# Patient Record
Sex: MALE | Race: WHITE | NOT HISPANIC OR LATINO | Employment: FULL TIME | URBAN - METROPOLITAN AREA
[De-identification: names, ages, dates, MRNs, and addresses within clinical notes are randomized per-mention and may not be internally consistent; named-entity substitution may affect disease eponyms.]

---

## 2019-06-11 ENCOUNTER — HOSPITAL ENCOUNTER (OUTPATIENT)
Dept: ULTRASOUND IMAGING | Facility: HOSPITAL | Age: 19
Discharge: HOME | End: 2019-06-11

## 2019-06-11 ENCOUNTER — HOSPITAL ENCOUNTER (EMERGENCY)
Facility: HOSPITAL | Age: 19
End: 2019-06-11

## 2019-06-11 ENCOUNTER — TRANSCRIBE ORDERS (OUTPATIENT)
Dept: ADMINISTRATIVE | Facility: HOSPITAL | Age: 19
End: 2019-06-11

## 2019-06-11 ENCOUNTER — HOSPITAL ENCOUNTER (OUTPATIENT)
Dept: ULTRASOUND IMAGING | Facility: HOSPITAL | Age: 19
Discharge: HOME | End: 2019-06-11
Admitting: EMERGENCY MEDICINE

## 2019-06-11 DIAGNOSIS — R10.31 RIGHT GROIN PAIN: Primary | ICD-10-CM

## 2019-06-11 DIAGNOSIS — R10.31 RT GROIN PAIN: Primary | ICD-10-CM

## 2019-06-11 DIAGNOSIS — R10.31 RIGHT GROIN PAIN: ICD-10-CM

## 2019-06-11 DIAGNOSIS — R10.31 RT GROIN PAIN: ICD-10-CM

## 2019-06-11 PROCEDURE — 93975 VASCULAR STUDY: CPT

## 2019-06-11 PROCEDURE — 76882 US LMTD JT/FCL EVL NVASC XTR: CPT

## 2019-06-11 PROCEDURE — 76870 US EXAM SCROTUM: CPT

## 2021-02-23 ENCOUNTER — OFFICE VISIT (OUTPATIENT)
Dept: FAMILY MEDICINE CLINIC | Facility: CLINIC | Age: 21
End: 2021-02-23

## 2021-02-23 VITALS
OXYGEN SATURATION: 98 % | HEIGHT: 65 IN | SYSTOLIC BLOOD PRESSURE: 150 MMHG | DIASTOLIC BLOOD PRESSURE: 90 MMHG | WEIGHT: 315 LBS | TEMPERATURE: 98 F | BODY MASS INDEX: 52.48 KG/M2 | HEART RATE: 87 BPM

## 2021-02-23 DIAGNOSIS — I10 SEVERE HYPERTENSION: ICD-10-CM

## 2021-02-23 DIAGNOSIS — I10 ESSENTIAL HYPERTENSION: Primary | ICD-10-CM

## 2021-02-23 DIAGNOSIS — E66.01 MORBID (SEVERE) OBESITY DUE TO EXCESS CALORIES (HCC): ICD-10-CM

## 2021-02-23 PROCEDURE — 99203 OFFICE O/P NEW LOW 30 MIN: CPT | Performed by: FAMILY MEDICINE

## 2021-02-23 RX ORDER — LISINOPRIL 20 MG/1
20 TABLET ORAL DAILY
Qty: 30 TABLET | Refills: 1 | Status: SHIPPED | OUTPATIENT
Start: 2021-02-23 | End: 2021-03-19 | Stop reason: SDUPTHER

## 2021-02-23 NOTE — PROGRESS NOTES
Chief Complaint   Patient presents with   • Establish Care   • Hypertension     BP issues was 190/107 yesterday 02.22.21       Subjective   Inocencio Ulrich is a 20 y.o. male.     History of Present Illness   20-year-old male here to establish as a new patient    Denies any past medical history.    Elevated blood pressure: He states he checked his blood pressure yesterday with a home wrist blood pressure cuff and it was 190.  Denies any prior history with blood pressure but upon further discussion says that it has slowly been increasing.  Today it was 150/90 in clinic.  He feels well and is asymptomatic.Body mass index is 57.86 kg/m².    He has a strong family history of hypertension as well as diabetes in his family.  BMI 57.  He states he does drink sodas daily.  Poor diet and sedentary job.  He also drinks beer almost every night.    No recent blood work he has not seen a doctor since he saw his pediatrician years prior.  Is not on any prescription medications.    Overall feels well.  Is going to be switching jobs to one that requires him to do more manual activities.    He does not smoke.  He has smoked in the past.      The following portions of the patient's history were reviewed and updated as appropriate: allergies, current medications, past family history, past medical history, past social history, past surgical history and problem list.      History reviewed. No pertinent past medical history.    History reviewed. No pertinent surgical history.    Family History   Problem Relation Age of Onset   • Hypertension Mother    • Depression Mother    • Anxiety disorder Mother    • Alcohol abuse Father        Social History     Socioeconomic History   • Marital status: Single     Spouse name: Not on file   • Number of children: Not on file   • Years of education: Not on file   • Highest education level: Not on file   Tobacco Use   • Smoking status: Former Smoker     Packs/day: 0.50     Types: Cigarettes   • Smokeless  tobacco: Never Used   Substance and Sexual Activity   • Drug use: Not Currently   • Sexual activity: Yes       No current outpatient medications on file prior to visit.     No current facility-administered medications on file prior to visit.        Review of Systems   Constitutional: Negative for activity change, chills and fever.   HENT: Negative for congestion, postnasal drip and rhinorrhea.    Eyes: Negative for blurred vision and pain.   Respiratory: Negative for cough, chest tightness and shortness of breath.    Cardiovascular: Negative for chest pain.   Gastrointestinal: Negative for abdominal pain, constipation, diarrhea, nausea and vomiting.   Endocrine: Negative for cold intolerance and heat intolerance.   Genitourinary: Negative for decreased urine volume, dysuria and frequency.   Musculoskeletal: Negative for arthralgias, back pain and myalgias.   Skin: Negative for rash and skin lesions.   Neurological: Negative for dizziness and confusion.   Psychiatric/Behavioral: Negative for agitation, behavioral problems and depressed mood.           Vitals:    02/23/21 1404   BP: 150/90   Pulse: 87   Temp: 98 °F (36.7 °C)   SpO2: 98%      Objective   Physical Exam  Vitals signs and nursing note reviewed.   Constitutional:       Appearance: He is well-developed. He is obese.   HENT:      Head: Normocephalic.   Eyes:      Conjunctiva/sclera: Conjunctivae normal.      Pupils: Pupils are equal, round, and reactive to light.   Neck:      Musculoskeletal: Neck supple.   Cardiovascular:      Rate and Rhythm: Normal rate and regular rhythm.      Heart sounds: Normal heart sounds. No murmur.   Pulmonary:      Effort: Pulmonary effort is normal. No respiratory distress.      Breath sounds: Normal breath sounds.   Abdominal:      General: Bowel sounds are normal.      Palpations: Abdomen is soft.   Musculoskeletal: Normal range of motion.   Skin:     General: Skin is warm and dry.   Neurological:      Mental Status: He is  alert and oriented to person, place, and time.           Assessment/Plan   Problems Addressed this Visit     None      Visit Diagnoses     Essential hypertension    -  Primary    Relevant Medications    lisinopril (PRINIVIL,ZESTRIL) 20 MG tablet    Other Relevant Orders    Hemoglobin A1c    Lipid Panel    CBC & Differential    Comprehensive Metabolic Panel    T4    TSH    Morbid (severe) obesity due to excess calories (CMS/HCC)        Relevant Orders    Hemoglobin A1c    Lipid Panel    CBC & Differential    Comprehensive Metabolic Panel    T4    TSH    Severe hypertension        Relevant Medications    lisinopril (PRINIVIL,ZESTRIL) 20 MG tablet    Other Relevant Orders    Hemoglobin A1c    Lipid Panel    CBC & Differential    Comprehensive Metabolic Panel    T4    TSH      Diagnoses       Codes Comments    Essential hypertension    -  Primary ICD-10-CM: I10  ICD-9-CM: 401.9     Morbid (severe) obesity due to excess calories (CMS/HCC)     ICD-10-CM: E66.01  ICD-9-CM: 278.01     Severe hypertension     ICD-10-CM: I10  ICD-9-CM: 401.9           -BMI 57.  Blood pressure elevated at 150/90.  -Will obtain blood work today.  Asymptomatic.  May need cardiology referral.  We will also start on lisinopril  -Consult on decreasing daily beer as well as soda and calories via liquid as well as cutting back on snacking and increasing physical activity     To seek further care more urgently if he ever has blood pressures that high in the future and to notify us if any side effects with the lisinopril    Azul Cortez MD

## 2021-02-24 LAB
ALBUMIN SERPL-MCNC: 4.6 G/DL (ref 4.1–5.2)
ALBUMIN/GLOB SERPL: 1.8 {RATIO} (ref 1.2–2.2)
ALP SERPL-CCNC: 67 IU/L (ref 39–117)
ALT SERPL-CCNC: 31 IU/L (ref 0–44)
AST SERPL-CCNC: 16 IU/L (ref 0–40)
BASOPHILS # BLD AUTO: 0.1 X10E3/UL (ref 0–0.2)
BASOPHILS NFR BLD AUTO: 1 %
BILIRUB SERPL-MCNC: 0.3 MG/DL (ref 0–1.2)
BUN SERPL-MCNC: 10 MG/DL (ref 6–20)
BUN/CREAT SERPL: 12 (ref 9–20)
CALCIUM SERPL-MCNC: 9.5 MG/DL (ref 8.7–10.2)
CHLORIDE SERPL-SCNC: 103 MMOL/L (ref 96–106)
CHOLEST SERPL-MCNC: 219 MG/DL (ref 100–199)
CO2 SERPL-SCNC: 25 MMOL/L (ref 20–29)
CREAT SERPL-MCNC: 0.81 MG/DL (ref 0.76–1.27)
EOSINOPHIL # BLD AUTO: 0.1 X10E3/UL (ref 0–0.4)
EOSINOPHIL NFR BLD AUTO: 2 %
ERYTHROCYTE [DISTWIDTH] IN BLOOD BY AUTOMATED COUNT: 13.1 % (ref 11.6–15.4)
GLOBULIN SER CALC-MCNC: 2.5 G/DL (ref 1.5–4.5)
GLUCOSE SERPL-MCNC: 92 MG/DL (ref 65–99)
HBA1C MFR BLD: 5.3 % (ref 4.8–5.6)
HCT VFR BLD AUTO: 45.8 % (ref 37.5–51)
HDLC SERPL-MCNC: 39 MG/DL
HGB BLD-MCNC: 15 G/DL (ref 13–17.7)
IMM GRANULOCYTES # BLD AUTO: 0 X10E3/UL (ref 0–0.1)
IMM GRANULOCYTES NFR BLD AUTO: 1 %
LDLC SERPL CALC-MCNC: 132 MG/DL (ref 0–99)
LYMPHOCYTES # BLD AUTO: 2 X10E3/UL (ref 0.7–3.1)
LYMPHOCYTES NFR BLD AUTO: 30 %
MCH RBC QN AUTO: 28.4 PG (ref 26.6–33)
MCHC RBC AUTO-ENTMCNC: 32.8 G/DL (ref 31.5–35.7)
MCV RBC AUTO: 87 FL (ref 79–97)
MONOCYTES # BLD AUTO: 0.5 X10E3/UL (ref 0.1–0.9)
MONOCYTES NFR BLD AUTO: 8 %
NEUTROPHILS # BLD AUTO: 4 X10E3/UL (ref 1.4–7)
NEUTROPHILS NFR BLD AUTO: 58 %
PLATELET # BLD AUTO: 238 X10E3/UL (ref 150–450)
POTASSIUM SERPL-SCNC: 4.2 MMOL/L (ref 3.5–5.2)
PROT SERPL-MCNC: 7.1 G/DL (ref 6–8.5)
RBC # BLD AUTO: 5.29 X10E6/UL (ref 4.14–5.8)
SODIUM SERPL-SCNC: 142 MMOL/L (ref 134–144)
T4 SERPL-MCNC: 7.5 UG/DL (ref 4.5–12)
TRIGL SERPL-MCNC: 267 MG/DL (ref 0–149)
TSH SERPL DL<=0.005 MIU/L-ACNC: 1.78 UIU/ML (ref 0.45–4.5)
VLDLC SERPL CALC-MCNC: 48 MG/DL (ref 5–40)
WBC # BLD AUTO: 6.7 X10E3/UL (ref 3.4–10.8)

## 2021-02-25 ENCOUNTER — TELEPHONE (OUTPATIENT)
Dept: FAMILY MEDICINE CLINIC | Facility: CLINIC | Age: 21
End: 2021-02-25

## 2021-02-25 NOTE — TELEPHONE ENCOUNTER
Caller: Inocencio Ulrich    Relationship: Self    Best call back number: [phone]    What test was performed: LAB WORK    When was the test performed: 02/23    Where was the test performed: [facility]    Additional notes: PLEASE CALL PT TO DISCUSS RESULTS

## 2021-02-25 NOTE — PROGRESS NOTES
Can you please notify:    His cholesterol was mildly elevated. Otherwise labs look good. His blood sugars are normal. His kidney function is normal. His thyroid looks good

## 2021-03-19 ENCOUNTER — OFFICE VISIT (OUTPATIENT)
Dept: FAMILY MEDICINE CLINIC | Facility: CLINIC | Age: 21
End: 2021-03-19

## 2021-03-19 VITALS
SYSTOLIC BLOOD PRESSURE: 136 MMHG | DIASTOLIC BLOOD PRESSURE: 84 MMHG | HEIGHT: 65 IN | WEIGHT: 315 LBS | BODY MASS INDEX: 52.48 KG/M2 | OXYGEN SATURATION: 98 % | HEART RATE: 80 BPM | TEMPERATURE: 98 F

## 2021-03-19 DIAGNOSIS — R63.4 WEIGHT LOSS: ICD-10-CM

## 2021-03-19 DIAGNOSIS — E78.2 MIXED HYPERLIPIDEMIA: ICD-10-CM

## 2021-03-19 DIAGNOSIS — I10 ESSENTIAL HYPERTENSION: Primary | ICD-10-CM

## 2021-03-19 DIAGNOSIS — E66.01 MORBID (SEVERE) OBESITY DUE TO EXCESS CALORIES (HCC): ICD-10-CM

## 2021-03-19 PROCEDURE — 99214 OFFICE O/P EST MOD 30 MIN: CPT | Performed by: FAMILY MEDICINE

## 2021-03-19 RX ORDER — LISINOPRIL 20 MG/1
20 TABLET ORAL DAILY
Qty: 90 TABLET | Refills: 1 | Status: SHIPPED | OUTPATIENT
Start: 2021-03-19 | End: 2021-12-10 | Stop reason: SDUPTHER

## 2021-03-19 NOTE — PROGRESS NOTES
Chief Complaint   Patient presents with   • Hypertension       Subjective   Inocencio Ulrich is a 20 y.o. male.     History of Present Illness   20-year-old male here for follow-up on hypertension    Hypertension: Doing well with lisinopril 20mg he is now normotensive in clinic and he checks at home.  He has not had any lightheadedness or dizziness.  He has no adverse effects.  No chest pain, shortness of breath etc.  Normotensive.  Tolerates lisinopril well.    Weight loss: He has lost 20 pounds since his last appointment.  Is working hard on dietary changes.  He will eat a banana for breakfast.  Drinks a lot of water.  Eats some protein for lunch.  And again for dinner.  He is not having any more soft drinks.  Has cut out sodas completely.  His family is working with him on this as well so he is good family support.  Body mass index is 54.58 kg/m².      The following portions of the patient's history were reviewed and updated as appropriate: allergies, current medications, past family history, past medical history, past social history, past surgical history and problem list.      No past medical history on file.    No past surgical history on file.    Family History   Problem Relation Age of Onset   • Hypertension Mother    • Depression Mother    • Anxiety disorder Mother    • Alcohol abuse Father        Social History     Socioeconomic History   • Marital status: Single     Spouse name: Not on file   • Number of children: Not on file   • Years of education: Not on file   • Highest education level: Not on file   Tobacco Use   • Smoking status: Former Smoker     Packs/day: 0.50     Types: Cigarettes   • Smokeless tobacco: Never Used   Substance and Sexual Activity   • Drug use: Not Currently   • Sexual activity: Yes       Current Outpatient Medications on File Prior to Visit   Medication Sig Dispense Refill   • [DISCONTINUED] lisinopril (PRINIVIL,ZESTRIL) 20 MG tablet Take 1 tablet by mouth Daily. 30 tablet 1     No  current facility-administered medications on file prior to visit.       Review of Systems   Constitutional: Negative for activity change, chills and fever.   HENT: Negative for congestion, postnasal drip and rhinorrhea.    Eyes: Negative for blurred vision and pain.   Respiratory: Negative for cough, chest tightness and shortness of breath.    Cardiovascular: Negative for chest pain.   Gastrointestinal: Negative for abdominal pain, constipation, diarrhea, nausea and vomiting.   Endocrine: Negative for cold intolerance and heat intolerance.   Genitourinary: Negative for decreased urine volume, dysuria and frequency.   Musculoskeletal: Negative for arthralgias, back pain and myalgias.   Skin: Negative for rash and skin lesions.   Neurological: Negative for dizziness and confusion.   Psychiatric/Behavioral: Negative for agitation, behavioral problems and depressed mood.           Vitals:    03/19/21 1052   BP: 136/84   Pulse: 80   Temp: 98 °F (36.7 °C)   SpO2: 98%      Objective   Physical Exam  Vitals and nursing note reviewed.   Constitutional:       Appearance: He is well-developed. He is obese.   HENT:      Head: Normocephalic.   Eyes:      Pupils: Pupils are equal, round, and reactive to light.   Cardiovascular:      Rate and Rhythm: Normal rate and regular rhythm.      Heart sounds: Normal heart sounds. No murmur heard.     Pulmonary:      Effort: Pulmonary effort is normal. No respiratory distress.      Breath sounds: Normal breath sounds.   Abdominal:      General: Bowel sounds are normal. There is no distension.      Palpations: Abdomen is soft. There is no mass.      Tenderness: There is no abdominal tenderness.      Hernia: No hernia is present.   Musculoskeletal:         General: Normal range of motion.      Cervical back: Neck supple.   Skin:     General: Skin is warm and dry.   Neurological:      Mental Status: He is alert and oriented to person, place, and time.           Assessment/Plan   Problems  Addressed this Visit     None      Visit Diagnoses     Essential hypertension    -  Primary    Relevant Medications    lisinopril (PRINIVIL,ZESTRIL) 20 MG tablet    BMI 50.0-59.9, adult (CMS/[facility])        Morbid (severe) obesity due to excess calories (CMS/[facility])        Weight loss        Mixed hyperlipidemia          Diagnoses       Codes Comments    Essential hypertension    -  Primary ICD-10-CM: I10  ICD-9-CM: 401.9     BMI 50.0-59.9, adult (CMS/[facility])     ICD-10-CM: Z68.43  ICD-9-CM: V85.43     Morbid (severe) obesity due to excess calories (CMS/[facility])     ICD-10-CM: E66.01  ICD-9-CM: 278.01     Weight loss     ICD-10-CM: R63.4  ICD-9-CM: 783.21     Mixed hyperlipidemia     ICD-10-CM: E78.2  ICD-9-CM: 272.2         -Losing weight and doing good job. Cutting out sodas.  Encouraged patient.  He has good family support in regard to this.  -Cholesterol is elevated but blood sugars were normal.  -We will have him continue doing what he is doing and continue lisinopril as he is normotensive on this 20 mg a day dose       Return in about 3 months (around 6/19/2021), or bp, weight check.      Azul Cortez MD

## 2021-04-07 ENCOUNTER — HOSPITAL ENCOUNTER (EMERGENCY)
Facility: HOSPITAL | Age: 21
Discharge: HOME | End: 2021-04-07
Attending: EMERGENCY MEDICINE | Admitting: EMERGENCY MEDICINE

## 2021-04-07 ENCOUNTER — APPOINTMENT (OUTPATIENT)
Dept: CT IMAGING | Facility: HOSPITAL | Age: 21
End: 2021-04-07

## 2021-04-07 VITALS
SYSTOLIC BLOOD PRESSURE: 128 MMHG | HEART RATE: 82 BPM | TEMPERATURE: 97.9 F | DIASTOLIC BLOOD PRESSURE: 59 MMHG | BODY MASS INDEX: 52.48 KG/M2 | HEIGHT: 65 IN | OXYGEN SATURATION: 94 % | WEIGHT: 315 LBS | RESPIRATION RATE: 18 BRPM

## 2021-04-07 DIAGNOSIS — R10.31 RIGHT LOWER QUADRANT ABDOMINAL PAIN: Primary | ICD-10-CM

## 2021-04-07 LAB
ALBUMIN SERPL-MCNC: 4.3 G/DL (ref 3.5–5.2)
ALBUMIN/GLOB SERPL: 1.5 G/DL
ALP SERPL-CCNC: 66 U/L (ref 39–117)
ALT SERPL W P-5'-P-CCNC: 25 U/L (ref 1–41)
ANION GAP SERPL CALCULATED.3IONS-SCNC: 12.2 MMOL/L (ref 5–15)
AST SERPL-CCNC: 15 U/L (ref 1–40)
BASOPHILS # BLD AUTO: 0.05 10*3/MM3 (ref 0–0.2)
BASOPHILS NFR BLD AUTO: 0.8 % (ref 0–1.5)
BILIRUB SERPL-MCNC: 0.5 MG/DL (ref 0–1.2)
BILIRUB UR QL STRIP: NEGATIVE
BUN SERPL-MCNC: 13 MG/DL (ref 6–20)
BUN/CREAT SERPL: 14.6 (ref 7–25)
CALCIUM SPEC-SCNC: 8.8 MG/DL (ref 8.6–10.5)
CHLORIDE SERPL-SCNC: 101 MMOL/L (ref 98–107)
CLARITY UR: CLEAR
CO2 SERPL-SCNC: 23.8 MMOL/L (ref 22–29)
COLOR UR: YELLOW
CREAT SERPL-MCNC: 0.89 MG/DL (ref 0.76–1.27)
DEPRECATED RDW RBC AUTO: 42.1 FL (ref 37–54)
EOSINOPHIL # BLD AUTO: 0.07 10*3/MM3 (ref 0–0.4)
EOSINOPHIL NFR BLD AUTO: 1.1 % (ref 0.3–6.2)
ERYTHROCYTE [DISTWIDTH] IN BLOOD BY AUTOMATED COUNT: 13.4 % (ref 12.3–15.4)
GFR SERPL CREATININE-BSD FRML MDRD: 107 ML/MIN/1.73
GLOBULIN UR ELPH-MCNC: 2.8 GM/DL
GLUCOSE SERPL-MCNC: 101 MG/DL (ref 65–99)
GLUCOSE UR STRIP-MCNC: NEGATIVE MG/DL
HCT VFR BLD AUTO: 42.9 % (ref 37.5–51)
HGB BLD-MCNC: 14.4 G/DL (ref 13–17.7)
HGB UR QL STRIP.AUTO: NEGATIVE
IMM GRANULOCYTES # BLD AUTO: 0.02 10*3/MM3 (ref 0–0.05)
IMM GRANULOCYTES NFR BLD AUTO: 0.3 % (ref 0–0.5)
KETONES UR QL STRIP: NEGATIVE
LEUKOCYTE ESTERASE UR QL STRIP.AUTO: NEGATIVE
LIPASE SERPL-CCNC: 20 U/L (ref 13–60)
LYMPHOCYTES # BLD AUTO: 1.62 10*3/MM3 (ref 0.7–3.1)
LYMPHOCYTES NFR BLD AUTO: 26.6 % (ref 19.6–45.3)
MCH RBC QN AUTO: 29.2 PG (ref 26.6–33)
MCHC RBC AUTO-ENTMCNC: 33.6 G/DL (ref 31.5–35.7)
MCV RBC AUTO: 87 FL (ref 79–97)
MONOCYTES # BLD AUTO: 0.53 10*3/MM3 (ref 0.1–0.9)
MONOCYTES NFR BLD AUTO: 8.7 % (ref 5–12)
NEUTROPHILS NFR BLD AUTO: 3.81 10*3/MM3 (ref 1.7–7)
NEUTROPHILS NFR BLD AUTO: 62.5 % (ref 42.7–76)
NITRITE UR QL STRIP: NEGATIVE
PH UR STRIP.AUTO: 5.5 [PH] (ref 4.5–8)
PLATELET # BLD AUTO: 212 10*3/MM3 (ref 140–450)
PMV BLD AUTO: 11.8 FL (ref 6–12)
POTASSIUM SERPL-SCNC: 4.2 MMOL/L (ref 3.5–5.2)
PROT SERPL-MCNC: 7.1 G/DL (ref 6–8.5)
PROT UR QL STRIP: NEGATIVE
RBC # BLD AUTO: 4.93 10*6/MM3 (ref 4.14–5.8)
SODIUM SERPL-SCNC: 137 MMOL/L (ref 136–145)
SP GR UR STRIP: 1.02 (ref 1–1.03)
UROBILINOGEN UR QL STRIP: NORMAL
WBC # BLD AUTO: 6.1 10*3/MM3 (ref 3.4–10.8)

## 2021-04-07 PROCEDURE — 85025 COMPLETE CBC W/AUTO DIFF WBC: CPT | Performed by: PHYSICIAN ASSISTANT

## 2021-04-07 PROCEDURE — 99283 EMERGENCY DEPT VISIT LOW MDM: CPT

## 2021-04-07 PROCEDURE — 83690 ASSAY OF LIPASE: CPT | Performed by: PHYSICIAN ASSISTANT

## 2021-04-07 PROCEDURE — 81003 URINALYSIS AUTO W/O SCOPE: CPT | Performed by: PHYSICIAN ASSISTANT

## 2021-04-07 PROCEDURE — 0 IOPAMIDOL PER 1 ML: Performed by: EMERGENCY MEDICINE

## 2021-04-07 PROCEDURE — 74177 CT ABD & PELVIS W/CONTRAST: CPT

## 2021-04-07 PROCEDURE — 80053 COMPREHEN METABOLIC PANEL: CPT | Performed by: PHYSICIAN ASSISTANT

## 2021-04-07 RX ORDER — SODIUM CHLORIDE 0.9 % (FLUSH) 0.9 %
10 SYRINGE (ML) INJECTION AS NEEDED
Status: DISCONTINUED | OUTPATIENT
Start: 2021-04-07 | End: 2021-04-07 | Stop reason: HOSPADM

## 2021-04-07 RX ADMIN — SODIUM CHLORIDE 1000 ML: 9 INJECTION, SOLUTION INTRAVENOUS at 12:08

## 2021-04-07 RX ADMIN — IOPAMIDOL 100 ML: 755 INJECTION, SOLUTION INTRAVENOUS at 12:49

## 2021-04-07 NOTE — ED PROVIDER NOTES
EMERGENCY DEPARTMENT ENCOUNTER      Room Number: 10/10    History is provided by the patient, no translation services needed    HPI:    Chief complaint: Abdominal pain    Location: Right lower    Quality/Severity: 0 out of 10    Timing/Duration: 2 days    Modifying Factors: Worse with urination    Associated Symptoms: No vomiting, no nausea, no diarrhea    Narrative: Pt is a 20 y.o. male who presents complaining of right lower quadrant pain x2 days.  Patient states that his pain is worse with urination and certain movements.  Patient states that his pain is also worse with palpation.  Patient denies any nausea, vomiting or diarrhea.  Patient denies any fever or chills.      PMD: Azul Cortez MD    REVIEW OF SYSTEMS  Review of Systems   Constitutional: Negative for chills and fever.   Eyes: Negative for pain and visual disturbance.   Respiratory: Negative for cough and shortness of breath.    Cardiovascular: Negative for chest pain and palpitations.   Gastrointestinal: Positive for abdominal pain. Negative for nausea and vomiting.   Genitourinary: Negative for difficulty urinating, dysuria and flank pain.   Musculoskeletal: Negative for gait problem and myalgias.   Skin: Negative for rash and wound.   Neurological: Negative for dizziness, syncope, numbness and headaches.   Psychiatric/Behavioral: Negative for confusion and suicidal ideas. The patient is not nervous/anxious.          PAST MEDICAL HISTORY  Active Ambulatory Problems     Diagnosis Date Noted   • No Active Ambulatory Problems     Resolved Ambulatory Problems     Diagnosis Date Noted   • No Resolved Ambulatory Problems     Past Medical History:   Diagnosis Date   • Hypertension        PAST SURGICAL HISTORY  Past Surgical History:   Procedure Laterality Date   • EAR TUBES     • UMBILICAL HERNIA REPAIR         FAMILY HISTORY  Family History   Problem Relation Age of Onset   • Hypertension Mother    • Depression Mother    • Anxiety disorder Mother     • Alcohol abuse Father        SOCIAL HISTORY  Social History     Socioeconomic History   • Marital status: Single     Spouse name: Not on file   • Number of children: Not on file   • Years of education: Not on file   • Highest education level: Not on file   Tobacco Use   • Smoking status: Former Smoker     Packs/day: 0.50     Types: Cigarettes   • Smokeless tobacco: Never Used   Vaping Use   • Vaping Use: Never used   Substance and Sexual Activity   • Alcohol use: Yes     Alcohol/week: 4.0 standard drinks     Types: 4 Cans of beer per week   • Drug use: Not Currently   • Sexual activity: Yes       ALLERGIES  Patient has no known allergies.      Current Facility-Administered Medications:   •  [COMPLETED] Insert peripheral IV, , , Once **AND** sodium chloride 0.9 % flush 10 mL, 10 mL, Intravenous, PRN, Bruno Bragg PA-C    Current Outpatient Medications:   •  metoprolol tartrate (LOPRESSOR) 25 MG tablet, Take 25 mg by mouth 2 (Two) Times a Day., Disp: , Rfl:   •  lisinopril (PRINIVIL,ZESTRIL) 20 MG tablet, Take 1 tablet by mouth Daily., Disp: 90 tablet, Rfl: 1    PHYSICAL EXAM  ED Triage Vitals [04/07/21 1149]   Temp Heart Rate Resp BP SpO2   97.9 °F (36.6 °C) 91 19 145/78 96 %      Temp src Heart Rate Source Patient Position BP Location FiO2 (%)   Oral Monitor Sitting Right arm --       Physical Exam  Vitals and nursing note reviewed.   HENT:      Head: Normocephalic and atraumatic.   Eyes:      Conjunctiva/sclera: Conjunctivae normal.   Cardiovascular:      Rate and Rhythm: Normal rate and regular rhythm.      Heart sounds: Normal heart sounds.   Pulmonary:      Effort: Pulmonary effort is normal. No respiratory distress.      Breath sounds: Normal breath sounds.   Abdominal:      General: Bowel sounds are normal. There is no distension.      Palpations: Abdomen is soft.      Tenderness: There is abdominal tenderness in the right lower quadrant. There is no right CVA tenderness or left CVA tenderness.  Negative signs include Dewitt's sign.   Musculoskeletal:         General: Normal range of motion.      Cervical back: Normal range of motion and neck supple.   Skin:     General: Skin is warm and dry.   Neurological:      Mental Status: He is alert and oriented to person, place, and time.   Psychiatric:         Mood and Affect: Mood and affect normal.           LAB RESULTS  Lab Results (last 24 hours)     Procedure Component Value Units Date/Time    CBC & Differential [[id number]]  (Normal) Collected: 04/07/21 1207    Specimen: Blood Updated: 04/07/21 1216    Narrative:      The following orders were created for panel order CBC & Differential.  Procedure                               Abnormality         Status                     ---------                               -----------         ------                     CBC Auto Differential[[id number]]        Normal              Final result                 Please view results for these tests on the individual orders.    Comprehensive Metabolic Panel [[id number]]  (Abnormal) Collected: 04/07/21 1207    Specimen: Blood Updated: 04/07/21 1231     Glucose 101 mg/dL      BUN 13 mg/dL      Creatinine 0.89 mg/dL      Sodium 137 mmol/L      Potassium 4.2 mmol/L      Chloride 101 mmol/L      CO2 23.8 mmol/L      Calcium 8.8 mg/dL      Total Protein 7.1 g/dL      Albumin 4.30 g/dL      ALT (SGPT) 25 U/L      AST (SGOT) 15 U/L      Alkaline Phosphatase 66 U/L      Total Bilirubin 0.5 mg/dL      eGFR Non African Amer 107 mL/min/1.73      Globulin 2.8 gm/dL      A/G Ratio 1.5 g/dL      BUN/Creatinine Ratio 14.6     Anion Gap 12.2 mmol/L     Narrative:      GFR Normal >60  Chronic Kidney Disease <60  Kidney Failure <15      Urinalysis With Microscopic If Indicated (No Culture) - Urine, Clean Catch [[id number]]  (Normal) Collected: 04/07/21 1207    Specimen: Urine, Clean Catch Updated: 04/07/21 1218     Color, UA Yellow     Appearance, UA Clear     pH, UA 5.5     Specific Gravity, UA  1.024     Comment: Result obtained by Refractometer        Glucose, UA Negative     Ketones, UA Negative     Bilirubin, UA Negative     Blood, UA Negative     Protein, UA Negative     Leuk Esterase, UA Negative     Nitrite, UA Negative     Urobilinogen, UA 0.2 E.U./dL    Narrative:      Urine microscopic not indicated.    Lipase [[id number]]  (Normal) Collected: 04/07/21 1207    Specimen: Blood Updated: 04/07/21 1230     Lipase 20 U/L     CBC Auto Differential [[id number]]  (Normal) Collected: 04/07/21 1207    Specimen: Blood Updated: 04/07/21 1216     WBC 6.10 10*3/mm3      RBC 4.93 10*6/mm3      Hemoglobin 14.4 g/dL      Hematocrit 42.9 %      MCV 87.0 fL      MCH 29.2 pg      MCHC 33.6 g/dL      RDW 13.4 %      RDW-SD 42.1 fl      MPV 11.8 fL      Platelets 212 10*3/mm3      Neutrophil % 62.5 %      Lymphocyte % 26.6 %      Monocyte % 8.7 %      Eosinophil % 1.1 %      Basophil % 0.8 %      Immature Grans % 0.3 %      Neutrophils, Absolute 3.81 10*3/mm3      Lymphocytes, Absolute 1.62 10*3/mm3      Monocytes, Absolute 0.53 10*3/mm3      Eosinophils, Absolute 0.07 10*3/mm3      Basophils, Absolute 0.05 10*3/mm3      Immature Grans, Absolute 0.02 10*3/mm3             I ordered the above labs and reviewed the results    RADIOLOGY  CT Abdomen Pelvis With Contrast    Result Date: 4/7/2021  CT ABDOMEN AND PELVIS WITH CONTRAST, 4/7/2021 HISTORY: 20-year-old male in the ED complaining of 1 day history right lower quadrant abdomen pain. Nausea. TECHNIQUE: CT imaging of the abdomen and pelvis with IV contrast. Radiation dose reduction techniques included automated exposure control. Radiation audit for CT and nuclear cardiology exams in the last 12 months: 0. ABDOMEN FINDINGS: The appendix is normal in appearance. Small bowel and colon are normal in caliber and appearance. No evidence of active GI tract inflammation or bowel obstruction. No gastric distention or distal esophageal dilatation. No gallbladder distention or bile  duct dilatation. Liver, pancreas and spleen are normal in size and appearance. Both kidneys are normal in size and appearance with normal parenchymal enhancement. No evidence of upper urinary tract obstruction. Normal adrenal glands. PELVIS FINDINGS: Urinary bladder, prostate and rectum are within normal limits. No free pelvic fluid. No inguinal hernia. Lung base images show no active disease.     Negative CT imaging of the abdomen and pelvis. Signer Name: Joselito Cooper MD  Signed: 4/7/2021 1:01 PM  Workstation Name: [id number]  [facility] [facility]      I ordered the above radiologic testing and reviewed the results    PROCEDURES  Procedures      PROGRESS AND CONSULTS  ED Course as of Apr 07 1317 Wed Apr 07, 2021   1218 WBC: 6.10 [GT]   1316 20-year-old male presents to the ED with complaints of right lower quadrant pain x2 days.  Patient was seen in the urgent care previously and sent here to rule out appendicitis.  Upon examination patient has tenderness in the right lower quadrant.  Patient has no CVA tenderness.  Laboratory studies were done showing a normal white count of 6.0.  Patient's urine showed no sign of infection or any hematuria.  Patient's electrolytes were unremarkable.  CT scan of the abdomen and pelvis showed no acute disease process.  I discussed results with patient while he was in the ED.  Discussed patient that he would need to follow-up with his PCP for further evaluation on his abdominal pain.  Patient expressed verbal understanding of plan of care.  Also instructed patient that if his symptoms worsen or persisted that he should return to the ED immediately.    [GT]      ED Course User Index  [GT] Bruno Bragg, SISSY           MEDICAL DECISION MAKING    MDM       DIAGNOSIS  Final diagnoses:   Right lower quadrant abdominal pain       Latest Documented Vital Signs:  As of 13:17 EDT  BP- 128/59 HR- 82 Temp- 97.9 °F (36.6 °C) (Oral) O2 sat- 94%    DISPOSITION  Discharged  home        Discussed pertinent findings with the patient/family.  Patient/Family voiced understanding of need to follow-up for recheck and further testing as needed.  Return to the Emergency Department warnings were given.         Medication List      No changes were made to your prescriptions during this visit.             Follow-up Information     Azul Cortez MD. Call today.    Specialty: Family Medicine  Why: To schedule a follow up appointment  Contact information:  [address] [facility] [address] [phone]                     Dictated utilizing Dragon dictation     Bruno Bragg PA-C  04/07/21 1272

## 2021-04-08 ENCOUNTER — OFFICE VISIT (OUTPATIENT)
Dept: FAMILY MEDICINE CLINIC | Facility: CLINIC | Age: 21
End: 2021-04-08

## 2021-04-08 VITALS
SYSTOLIC BLOOD PRESSURE: 126 MMHG | BODY MASS INDEX: 52.48 KG/M2 | HEART RATE: 82 BPM | TEMPERATURE: 98 F | DIASTOLIC BLOOD PRESSURE: 80 MMHG | OXYGEN SATURATION: 99 % | WEIGHT: 315 LBS | HEIGHT: 65 IN

## 2021-04-08 DIAGNOSIS — K59.09 OTHER CONSTIPATION: ICD-10-CM

## 2021-04-08 DIAGNOSIS — R10.84 GENERALIZED ABDOMINAL PAIN: Primary | ICD-10-CM

## 2021-04-08 PROCEDURE — 99213 OFFICE O/P EST LOW 20 MIN: CPT | Performed by: FAMILY MEDICINE

## 2021-04-08 NOTE — PROGRESS NOTES
Chief Complaint   Patient presents with   • Abdominal Pain     ER yesterday        Subjective   Inocencio Ulrich is a 20 y.o. male.     History of Present Illness   20-year-old male here for ER follow-up visit    He went to the urgent care initially for right lower quadrant pain sent him to the ER for rule out appendicitis.  At the ER he had a negative abdominal and pelvic CT with contrast.  His white count was normal.  CMP unremarkable.  Lipase normal.  They discharged him to home.  Today his pain is much improved.    He states he started feeling some abdominal discomfort after eating some meat loaf in the [employer] lean wrap.  No other associated symptoms.  No nausea, vomiting, diarrhea or constipation.  No fever chills.    Currently feeling better.  Did have somewhat of a more constipated bowel movement last night.    He is working to lose weight.  He has not changed his diet drastically but is eating less.    The following portions of the patient's history were reviewed and updated as appropriate: allergies, current medications, past family history, past medical history, past social history, past surgical history and problem list.      Past Medical History:   Diagnosis Date   • Hypertension        Past Surgical History:   Procedure Laterality Date   • EAR TUBES     • UMBILICAL HERNIA REPAIR         Family History   Problem Relation Age of Onset   • Hypertension Mother    • Depression Mother    • Anxiety disorder Mother    • Alcohol abuse Father        Social History     Socioeconomic History   • Marital status: Single     Spouse name: Not on file   • Number of children: Not on file   • Years of education: Not on file   • Highest education level: Not on file   Tobacco Use   • Smoking status: Former Smoker     Packs/day: 0.50     Types: Cigarettes   • Smokeless tobacco: Never Used   Vaping Use   • Vaping Use: Never used   Substance and Sexual Activity   • Alcohol use: Yes     Alcohol/week: 4.0 standard drinks      Types: 4 Cans of beer per week   • Drug use: Not Currently   • Sexual activity: Yes       Current Outpatient Medications on File Prior to Visit   Medication Sig Dispense Refill   • [DISCONTINUED] metoprolol tartrate (LOPRESSOR) 25 MG tablet Take 25 mg by mouth 2 (Two) Times a Day.     • lisinopril (PRINIVIL,ZESTRIL) 20 MG tablet Take 1 tablet by mouth Daily. 90 tablet 1     Current Facility-Administered Medications on File Prior to Visit   Medication Dose Route Frequency Provider Last Rate Last Admin   • [COMPLETED] iopamidol (ISOVUE-370) 76 % injection 100 mL  100 mL Intravenous Once in imaging Ubaldo Christensen MD   100 mL at 04/07/21 1249   • [COMPLETED] sodium chloride 0.9 % bolus 1,000 mL  1,000 mL Intravenous Once Bruno Bragg PA-C   Stopped at 04/07/21 1314   • [DISCONTINUED] sodium chloride 0.9 % flush 10 mL  10 mL Intravenous PRN Bruno Bragg PA-C           Review of Systems   Constitutional: Negative for chills and fever.   HENT: Negative for congestion.    Respiratory: Negative for shortness of breath and wheezing.    Cardiovascular: Negative for chest pain.   Gastrointestinal: Positive for abdominal pain and nausea. Negative for constipation, diarrhea, vomiting and GERD.   Musculoskeletal: Negative for back pain.   Neurological: Negative for dizziness, weakness and confusion.   Psychiatric/Behavioral: Negative for behavioral problems and sleep disturbance. The patient is not nervous/anxious.            Vitals:    04/08/21 0800   BP: 126/80   Pulse: 82   Temp: 98 °F (36.7 °C)   SpO2: 99%      Objective   Physical Exam  Vitals and nursing note reviewed.   Constitutional:       Appearance: He is well-developed. He is obese.   HENT:      Head: Normocephalic.   Eyes:      Conjunctiva/sclera: Conjunctivae normal.      Pupils: Pupils are equal, round, and reactive to light.   Cardiovascular:      Rate and Rhythm: Normal rate and regular rhythm.      Heart sounds: Normal heart sounds. No murmur  heard.     Pulmonary:      Effort: Pulmonary effort is normal. No respiratory distress.      Breath sounds: Normal breath sounds.   Abdominal:      General: Bowel sounds are normal.      Palpations: Abdomen is soft.   Musculoskeletal:         General: Normal range of motion.      Cervical back: Neck supple.   Skin:     General: Skin is warm and dry.   Neurological:      Mental Status: He is alert and oriented to person, place, and time.           Assessment/Plan   Problems Addressed this Visit     None      Visit Diagnoses     Generalized abdominal pain    -  Primary    Other constipation          Diagnoses       Codes Comments    Generalized abdominal pain    -  Primary ICD-10-CM: R10.84  ICD-9-CM: 789.07     Other constipation     ICD-10-CM: K59.09  ICD-9-CM: 564.09           -We will write for 1 capful of MiraLAX per 8 ounces of water daily to address constipation  -His abdominal pain is feeling better now.  Benign abdominal exam.  Stable vitals.  Afebrile.  -To notify us if anything changes or if it worsens again.  May have been related to what he ate that day       Azul Cortez MD

## 2021-06-18 ENCOUNTER — OFFICE VISIT (OUTPATIENT)
Dept: FAMILY MEDICINE CLINIC | Facility: CLINIC | Age: 21
End: 2021-06-18

## 2021-06-18 VITALS
BODY MASS INDEX: 52.48 KG/M2 | HEIGHT: 65 IN | WEIGHT: 315 LBS | DIASTOLIC BLOOD PRESSURE: 82 MMHG | TEMPERATURE: 97.1 F | OXYGEN SATURATION: 98 % | SYSTOLIC BLOOD PRESSURE: 138 MMHG | HEART RATE: 88 BPM

## 2021-06-18 DIAGNOSIS — I10 ESSENTIAL HYPERTENSION: Primary | ICD-10-CM

## 2021-06-18 DIAGNOSIS — E66.01 MORBID (SEVERE) OBESITY DUE TO EXCESS CALORIES (HCC): ICD-10-CM

## 2021-06-18 PROCEDURE — 99214 OFFICE O/P EST MOD 30 MIN: CPT | Performed by: FAMILY MEDICINE

## 2021-06-18 NOTE — PROGRESS NOTES
Chief Complaint   Patient presents with   • Hypertension       Subjective   Inocencio Ulrich is a 21 y.o. male.     History of Present Illness   21-year-old male here for follow-up on stage II hypertension    Blood pressure was over 140/90.  Also with episodes above 150.  On several different instances.    Blood sugar was normal.  Borderline high cholesterol.  Normal thyroid.  Normal renal function.     Was started on lisinopril 20 mg once daily for preventative issues on down the line with risk of renal damage/heart failure etc. due to uncontrolled hypertension.      Body mass index is 53.98 kg/m².     Poor diet lately.  Has been eating a lot of fast food but now eats more junky type food.  Likes candy and candy bars.  Has never met with a nutritionist before.    the following portions of the patient's history were reviewed and updated as appropriate: allergies, current medications, past family history, past medical history, past social history, past surgical history and problem list.      Past Medical History:   Diagnosis Date   • Hypertension        Past Surgical History:   Procedure Laterality Date   • EAR TUBES     • UMBILICAL HERNIA REPAIR         Family History   Problem Relation Age of Onset   • Hypertension Mother    • Depression Mother    • Anxiety disorder Mother    • Alcohol abuse Father        Social History     Socioeconomic History   • Marital status: Single     Spouse name: Not on file   • Number of children: Not on file   • Years of education: Not on file   • Highest education level: Not on file   Tobacco Use   • Smoking status: Former Smoker     Packs/day: 0.50     Types: Cigarettes   • Smokeless tobacco: Never Used   Vaping Use   • Vaping Use: Never used   Substance and Sexual Activity   • Alcohol use: Yes     Alcohol/week: 4.0 standard drinks     Types: 4 Cans of beer per week   • Drug use: Not Currently   • Sexual activity: Yes       Current Outpatient Medications on File Prior to Visit   Medication  Sig Dispense Refill   • lisinopril (PRINIVIL,ZESTRIL) 20 MG tablet Take 1 tablet by mouth Daily. 90 tablet 1     No current facility-administered medications on file prior to visit.       Review of Systems   Constitutional: Negative for activity change, chills and fever.   HENT: Negative for congestion, postnasal drip and rhinorrhea.    Eyes: Negative for blurred vision and pain.   Respiratory: Negative for cough, chest tightness and shortness of breath.    Cardiovascular: Negative for chest pain.   Gastrointestinal: Negative for abdominal pain, constipation, diarrhea, nausea and vomiting.   Endocrine: Negative for cold intolerance and heat intolerance.   Genitourinary: Negative for decreased urine volume, dysuria and frequency.   Musculoskeletal: Negative for arthralgias, back pain and myalgias.   Skin: Negative for rash and skin lesions.   Neurological: Negative for dizziness and confusion.   Psychiatric/Behavioral: Negative for agitation, behavioral problems and depressed mood.           Vitals:    06/18/21 1058   BP: 138/82   Pulse: 88   Temp: 97.1 °F (36.2 °C)   SpO2: 98%      Objective   Physical Exam  Vitals and nursing note reviewed.   Constitutional:       Appearance: He is well-developed.   Eyes:      Pupils: Pupils are equal, round, and reactive to light.   Cardiovascular:      Rate and Rhythm: Normal rate and regular rhythm.      Heart sounds: Normal heart sounds. No murmur heard.     Pulmonary:      Effort: Pulmonary effort is normal. No respiratory distress.      Breath sounds: Normal breath sounds.   Abdominal:      Palpations: Abdomen is soft.   Musculoskeletal:         General: Normal range of motion.      Cervical back: Neck supple.   Skin:     General: Skin is warm and dry.   Neurological:      Mental Status: He is alert and oriented to person, place, and time.           Assessment/Plan   Problems Addressed this Visit     None      Visit Diagnoses     Essential hypertension    -  Primary    Body  mass index (BMI) of 45.0 to 49.9 in adult (CMS/[facility])        Morbid (severe) obesity due to excess calories (CMS/[facility])          Diagnoses       Codes Comments    Essential hypertension    -  Primary ICD-10-CM: I10  ICD-9-CM: 401.9     Body mass index (BMI) of 45.0 to 49.9 in adult (CMS/[facility])     ICD-10-CM: Z68.42  ICD-9-CM: V85.42     Morbid (severe) obesity due to excess calories (CMS/HCC)     ICD-10-CM: E66.01  ICD-9-CM: 278.01         BP control, tolerating lisinopril without issue has been more stable  Diet, meal prepping, increasing protein, cutting out fast food and liquid calories  Nutritionist  6 mo f/u         Return in about 6 months (around 12/18/2021) for Annual.      Azul Cortez MD

## 2021-08-18 ENCOUNTER — HOSPITAL ENCOUNTER (OUTPATIENT)
Dept: DIABETES SERVICES | Facility: HOSPITAL | Age: 21
Discharge: HOME | End: 2021-08-18
Admitting: FAMILY MEDICINE

## 2021-08-18 PROCEDURE — 97802 MEDICAL NUTRITION INDIV IN: CPT

## 2021-08-24 ENCOUNTER — OFFICE VISIT (OUTPATIENT)
Dept: FAMILY MEDICINE CLINIC | Facility: CLINIC | Age: 21
End: 2021-08-24

## 2021-08-24 VITALS
TEMPERATURE: 99.6 F | BODY MASS INDEX: 53.75 KG/M2 | HEIGHT: 65 IN | OXYGEN SATURATION: 98 % | HEART RATE: 89 BPM | DIASTOLIC BLOOD PRESSURE: 84 MMHG | SYSTOLIC BLOOD PRESSURE: 122 MMHG

## 2021-08-24 DIAGNOSIS — R53.83 MALAISE AND FATIGUE: Primary | ICD-10-CM

## 2021-08-24 DIAGNOSIS — R53.81 MALAISE AND FATIGUE: Primary | ICD-10-CM

## 2021-08-24 DIAGNOSIS — Z92.29 COVID-19 VACCINE SERIES COMPLETED: ICD-10-CM

## 2021-08-24 PROCEDURE — 99213 OFFICE O/P EST LOW 20 MIN: CPT | Performed by: FAMILY MEDICINE

## 2021-08-24 NOTE — PROGRESS NOTES
Chief Complaint   Patient presents with   • Fever   • Headache   • Fatigue   • Nausea       Subjective   Inocencio Ulrich is a 21 y.o. male.     History of Present Illness   21-year-old male here for acute add-on visit    Received his second dose of the Covid vaccine Pfizer and started to feel unwell.  Cites malaise.  As well as some fatigue.  And nausea.  Nonspecific symptoms.  No respiratory symptoms such as cough shortness of breath chest congestion or nasal congestion.    Recovered today and feels at his baseline.    The following portions of the patient's history were reviewed and updated as appropriate: allergies, current medications, past family history, past medical history, past social history, past surgical history and problem list.      Past Medical History:   Diagnosis Date   • Hypertension        Past Surgical History:   Procedure Laterality Date   • EAR TUBES     • UMBILICAL HERNIA REPAIR         Family History   Problem Relation Age of Onset   • Hypertension Mother    • Depression Mother    • Anxiety disorder Mother    • Alcohol abuse Father        Social History     Socioeconomic History   • Marital status: Single     Spouse name: Not on file   • Number of children: Not on file   • Years of education: Not on file   • Highest education level: Not on file   Tobacco Use   • Smoking status: Former Smoker     Packs/day: 0.50     Types: Cigarettes   • Smokeless tobacco: Never Used   Vaping Use   • Vaping Use: Never used   Substance and Sexual Activity   • Alcohol use: Yes     Alcohol/week: 4.0 standard drinks     Types: 4 Cans of beer per week   • Drug use: Not Currently   • Sexual activity: Yes       Current Outpatient Medications on File Prior to Visit   Medication Sig Dispense Refill   • lisinopril (PRINIVIL,ZESTRIL) 20 MG tablet Take 1 tablet by mouth Daily. 90 tablet 1     No current facility-administered medications on file prior to visit.       Review of Systems   Constitutional: Positive for fatigue.    HENT: Negative for congestion, rhinorrhea and sinus pressure.    Respiratory: Negative for cough, chest tightness, shortness of breath and wheezing.    Cardiovascular: Negative for chest pain and palpitations.   Gastrointestinal: Negative for abdominal pain and vomiting.   Genitourinary: Negative for decreased urine volume.   Neurological: Negative for weakness and confusion.           Vitals:    08/24/21 1343   BP: 122/84   Pulse: 89   Temp: 99.6 °F (37.6 °C)   SpO2: 98%      Objective   Physical Exam  Vitals and nursing note reviewed.   Constitutional:       Appearance: He is well-developed.   Cardiovascular:      Rate and Rhythm: Normal rate and regular rhythm.      Heart sounds: Normal heart sounds. No murmur heard.     Pulmonary:      Effort: Pulmonary effort is normal. No respiratory distress.      Breath sounds: Normal breath sounds.   Abdominal:      General: Bowel sounds are normal.      Palpations: Abdomen is soft.   Musculoskeletal:         General: Normal range of motion.      Cervical back: Neck supple.   Skin:     General: Skin is warm and dry.   Neurological:      Mental Status: He is alert and oriented to person, place, and time.           Assessment/Plan   Problems Addressed this Visit     None      Visit Diagnoses     Malaise and fatigue    -  Primary    COVID-19 vaccine series completed          Diagnoses       Codes Comments    Malaise and fatigue    -  Primary ICD-10-CM: R53.81, R53.83  ICD-9-CM: 780.79     COVID-19 vaccine series completed     ICD-10-CM: Z92.29  ICD-9-CM: V87.49       Vital signs stable, no respiratory symptoms on exam    -Feels recovered at this time.  Declines Covid testing  -Would have him follow-up if his symptoms return.  Increase his hydration.  He has a low-grade temperature at this visit but he states he did just come in from sitting in his car for a long period of time         Azul Cortez MD

## 2021-12-10 ENCOUNTER — OFFICE VISIT (OUTPATIENT)
Dept: FAMILY MEDICINE CLINIC | Facility: CLINIC | Age: 21
End: 2021-12-10

## 2021-12-10 VITALS
OXYGEN SATURATION: 97 % | SYSTOLIC BLOOD PRESSURE: 144 MMHG | BODY MASS INDEX: 52.48 KG/M2 | HEIGHT: 65 IN | WEIGHT: 315 LBS | HEART RATE: 96 BPM | DIASTOLIC BLOOD PRESSURE: 98 MMHG | TEMPERATURE: 97.5 F

## 2021-12-10 DIAGNOSIS — E66.01 MORBID OBESITY WITH BMI OF 50.0-59.9, ADULT (HCC): ICD-10-CM

## 2021-12-10 DIAGNOSIS — I10 BENIGN ESSENTIAL HTN: Primary | ICD-10-CM

## 2021-12-10 DIAGNOSIS — I10 ESSENTIAL HYPERTENSION: ICD-10-CM

## 2021-12-10 PROCEDURE — 99214 OFFICE O/P EST MOD 30 MIN: CPT | Performed by: FAMILY MEDICINE

## 2021-12-10 RX ORDER — LISINOPRIL 20 MG/1
20 TABLET ORAL DAILY
Qty: 90 TABLET | Refills: 1 | Status: SHIPPED | OUTPATIENT
Start: 2021-12-10 | End: 2022-03-11 | Stop reason: SDUPTHER

## 2021-12-10 NOTE — PROGRESS NOTES
Chief Complaint   Patient presents with   • Hypertension       Subjective   Inocencio Ulrich is a 21 y.o. male.     History of Present Illness     Blood pressure was over 140/90.  Also with episodes above 150.  On several different instances.    Blood sugar was normal.  Borderline high cholesterol.  Normal thyroid.  Normal renal function.      Was started on lisinopril 20 mg once daily for preventative issues on down the line with risk of renal damage/heart failure etc. due to uncontrolled hypertension.    Has been off of his blood pressure medicines/ blood pressure elevated but asymptomatic today     Body mass index is 53.98 kg/m². ROXANE risk factors?  Eating out more regularly day-to-day at work and has gained weight per patient    The following portions of the patient's history were reviewed and updated as appropriate: allergies, current medications, past family history, past medical history, past social history, past surgical history and problem list.      Past Medical History:   Diagnosis Date   • Hypertension        Past Surgical History:   Procedure Laterality Date   • EAR TUBES     • UMBILICAL HERNIA REPAIR         Family History   Problem Relation Age of Onset   • Hypertension Mother    • Depression Mother    • Anxiety disorder Mother    • Alcohol abuse Father        Social History     Socioeconomic History   • Marital status: Single   Tobacco Use   • Smoking status: Former Smoker     Packs/day: 0.50     Types: Cigarettes   • Smokeless tobacco: Never Used   Vaping Use   • Vaping Use: Never used   Substance and Sexual Activity   • Alcohol use: Yes     Alcohol/week: 4.0 standard drinks     Types: 4 Cans of beer per week   • Drug use: Not Currently   • Sexual activity: Yes       Current Outpatient Medications on File Prior to Visit   Medication Sig Dispense Refill   • [DISCONTINUED] lisinopril (PRINIVIL,ZESTRIL) 20 MG tablet Take 1 tablet by mouth Daily. 90 tablet 1     No current facility-administered medications  on file prior to visit.       Review of Systems   Constitutional: Negative for fever.   HENT: Negative for congestion.    Respiratory: Negative for shortness of breath.    Cardiovascular: Negative for chest pain.   Gastrointestinal: Negative for abdominal pain.   Genitourinary: Negative for decreased urine volume.   Musculoskeletal: Negative for back pain.   Skin: Negative for dry skin.   Neurological: Negative for weakness.   Psychiatric/Behavioral: Negative for depressed mood.           Vitals:    12/10/21 0918   BP: 144/98   Pulse: 96   Temp: 97.5 °F (36.4 °C)   SpO2: 97%      Objective   Physical Exam  Vitals and nursing note reviewed.   Constitutional:       Appearance: He is well-developed.   HENT:      Head: Normocephalic.   Cardiovascular:      Rate and Rhythm: Normal rate and regular rhythm.      Heart sounds: Normal heart sounds. No murmur heard.      Pulmonary:      Effort: Pulmonary effort is normal. No respiratory distress.      Breath sounds: Normal breath sounds.   Abdominal:      General: Bowel sounds are normal.      Palpations: Abdomen is soft.   Musculoskeletal:         General: Normal range of motion.      Cervical back: Neck supple.   Skin:     General: Skin is warm and dry.   Neurological:      Mental Status: He is alert and oriented to person, place, and time.           Assessment/Plan   Problems Addressed this Visit     None      Visit Diagnoses     Benign essential HTN    -  Primary    Relevant Medications    lisinopril (PRINIVIL,ZESTRIL) 20 MG tablet    Other Relevant Orders    Ambulatory Referral to Bariatric Surgery (Completed)    Morbid obesity with BMI of 50.0-59.9, adult (HCC)        Relevant Orders    Ambulatory Referral to Bariatric Surgery (Completed)    Essential hypertension        Relevant Medications    lisinopril (PRINIVIL,ZESTRIL) 20 MG tablet      Diagnoses       Codes Comments    Benign essential HTN    -  Primary ICD-10-CM: I10  ICD-9-CM: 401.1     Morbid obesity with BMI  of 50.0-59.9, adult (HCC)     ICD-10-CM: E66.01, Z68.43  ICD-9-CM: 278.01, V85.43     Essential hypertension     ICD-10-CM: I10  ICD-9-CM: 401.9         Discuss nonsurgical weight management.  Potentially Ozempic etc.  For now will place referral to weight management services    Hypertension: Resume lisinopril, follow-up several months           Azul Cortez MD

## 2022-01-27 ENCOUNTER — HOSPITAL ENCOUNTER (OUTPATIENT)
Dept: GENERAL RADIOLOGY | Facility: HOSPITAL | Age: 22
Discharge: HOME | End: 2022-01-27
Admitting: PHYSICIAN ASSISTANT

## 2022-01-27 ENCOUNTER — OFFICE VISIT (OUTPATIENT)
Dept: FAMILY MEDICINE CLINIC | Facility: CLINIC | Age: 22
End: 2022-01-27

## 2022-01-27 VITALS
HEIGHT: 65 IN | HEART RATE: 100 BPM | DIASTOLIC BLOOD PRESSURE: 70 MMHG | WEIGHT: 315 LBS | OXYGEN SATURATION: 100 % | SYSTOLIC BLOOD PRESSURE: 120 MMHG | BODY MASS INDEX: 52.48 KG/M2 | TEMPERATURE: 98.4 F

## 2022-01-27 DIAGNOSIS — R07.9 LEFT-SIDED CHEST PAIN: ICD-10-CM

## 2022-01-27 DIAGNOSIS — U07.1 COVID-19 VIRUS INFECTION: ICD-10-CM

## 2022-01-27 DIAGNOSIS — R07.9 LEFT-SIDED CHEST PAIN: Primary | ICD-10-CM

## 2022-01-27 PROCEDURE — 93000 ELECTROCARDIOGRAM COMPLETE: CPT | Performed by: PHYSICIAN ASSISTANT

## 2022-01-27 PROCEDURE — 71046 X-RAY EXAM CHEST 2 VIEWS: CPT

## 2022-01-27 PROCEDURE — 99214 OFFICE O/P EST MOD 30 MIN: CPT | Performed by: PHYSICIAN ASSISTANT

## 2022-01-27 NOTE — PROGRESS NOTES
"Chief Complaint  Chest Pain (left side mainly when breathing)    Subjective          Inocencio Ulrich presents to [facility]  History of Present Illness  Inocencio is a 21-year-old male who presents with new left-sided chest pain mainly when breathing.  States the pain resolves after he exhales.  This is been going on for the past few days.  States he had a Covid infection about 2-3 weeks ago.  He has had some belching and some heartburn.  Describes the pain as a dull ache.  Rates the pain as a 3-5 out of 10.  Denied any radiation of pain to neck or down the arms.  Denied any shortness of breath, wheezing, fevers, chills, cough, GI upset or swelling of ankles.  Caffeine intake is occasional soda.  He does drink about 6 beers daily.  Also eats spicy foods.  Had been doing some heavy lifting yesterday but denied any work injury.  Bowel movements have been daily without dark black tarry stools.  Has not taken any over-the-counter medications.  Review of Systems   Cardiovascular: Positive for chest pain.   All other systems reviewed and are negative.    Objective   Vital Signs:   /70   Pulse 100   Temp 98.4 °F (36.9 °C)   Ht 165.1 cm (65\")   Wt (!) 158 kg (348 lb)   SpO2 100%   BMI 57.91 kg/m²     Physical Exam  Vitals and nursing note reviewed.   Constitutional:       Appearance: Normal appearance. He is well-developed and well-groomed. He is morbidly obese.      Interventions: Face mask in place.   HENT:      Head: Normocephalic and atraumatic.      Jaw: There is normal jaw occlusion.      Right Ear: Hearing, tympanic membrane, ear canal and external ear normal.      Left Ear: Hearing, tympanic membrane, ear canal and external ear normal.      Nose: Nose normal.      Right Sinus: No maxillary sinus tenderness or frontal sinus tenderness.      Left Sinus: No maxillary sinus tenderness or frontal sinus tenderness.      Mouth/Throat:      Lips: Pink.      Mouth: Mucous membranes are " moist.      Dentition: Normal dentition.      Tongue: No lesions.      Pharynx: Oropharynx is clear. Uvula midline.      Tonsils: No tonsillar exudate.   Eyes:      General: Lids are normal.      Conjunctiva/sclera: Conjunctivae normal.      Pupils: Pupils are equal, round, and reactive to light.   Neck:      Thyroid: No thyromegaly.      Vascular: No carotid bruit.      Trachea: Trachea and phonation normal. No tracheal tenderness.   Cardiovascular:      Rate and Rhythm: Normal rate and regular rhythm.      Pulses: Normal pulses.      Heart sounds: Normal heart sounds, S1 normal and S2 normal. No murmur heard.      Pulmonary:      Effort: Pulmonary effort is normal.      Breath sounds: Normal breath sounds and air entry.   Abdominal:      General: Bowel sounds are normal.      Palpations: Abdomen is soft.      Tenderness: There is no abdominal tenderness. There is no right CVA tenderness, left CVA tenderness, guarding or rebound. Negative signs include Dewitt's sign, Rovsing's sign, McBurney's sign, psoas sign and obturator sign.   Musculoskeletal:      Cervical back: Neck supple.      Right lower leg: No edema.      Left lower leg: No edema.   Lymphadenopathy:      Cervical: No cervical adenopathy.      Right cervical: No superficial, deep or posterior cervical adenopathy.     Left cervical: No superficial, deep or posterior cervical adenopathy.   Skin:     General: Skin is warm and dry.      Capillary Refill: Capillary refill takes less than 2 seconds.   Neurological:      Mental Status: He is alert and oriented to person, place, and time.   Psychiatric:         Attention and Perception: Attention and perception normal.         Mood and Affect: Mood and affect normal.         Speech: Speech normal.         Behavior: Behavior is cooperative.         Thought Content: Thought content normal.         Cognition and Memory: Cognition and memory normal.         Judgment: Judgment normal.     I wore a facial mask, face  shield, and gloves during this patient encounter.  Patient also wearing a surgical mask.  Hand hygiene performed before and after seeing the patient.     Result Review :        ECG 12 Lead (07/12/2021 13:58)          ECG 12 Lead    Date/Time: 1/27/2022 2:37 PM  Performed by: Araceli Lobo PA-C  Authorized by: Araceli Lobo PA-C   Comparison: compared with previous ECG from 7/12/2021  Rhythm: sinus rhythm  Rate: normal  BPM: 89  Conduction: conduction normal  ST Segments: ST segments normal  T Waves: T waves normal  QRS axis: normal  Other findings comments: nonspecific T wave abnormaliity    Clinical impression: non-specific ECG  Comments: Indication:  Left sided chest pain with recent COVID infection 2-3 weeks ago  MO int:  152 ms  QRS dur:  100 ms  QT/QTc:  331/378 ms                Assessment and Plan    Diagnoses and all orders for this visit:    1. Left-sided chest pain (Primary)  -     XR Chest PA & Lateral; Future  -     ECG 12 Lead    2. COVID-19 virus infection  -     XR Chest PA & Lateral; Future  -     ECG 12 Lead    Inocencio was seen in office today with new left-sided chest pain with recent COVID-19 infection.  In office EKG was borderline normal.  We will proceed with chest x-ray.  He will be notified of test results when completed.  Stressed importance of decreasing caffeine and alcohol in diet.  Will consider Chest CTA or CT of chest if normal chest x-ray.  We will also consider D-dimer.  Inocencio was instructed if symptoms worsen, he will proceed to the nearest emergency room for further evaluation and treatment.  He voiced understanding.    Follow Up   Return if symptoms worsen or fail to improve.  Patient was given instructions and counseling regarding his condition or for health maintenance advice. Please see specific information pulled into the AVS if appropriate.     [facility]  [address] [place] [address] [address]  [place]  [place] [address]  Dept: [phone]  Dept Fax: [phone]  Loc: [phone]  Loc Fax: [phone]

## 2022-01-28 ENCOUNTER — LAB (OUTPATIENT)
Dept: LAB | Facility: HOSPITAL | Age: 22
End: 2022-01-28

## 2022-01-28 DIAGNOSIS — U07.1 COVID-19 VIRUS INFECTION: ICD-10-CM

## 2022-01-28 DIAGNOSIS — R07.9 LEFT-SIDED CHEST PAIN: Primary | ICD-10-CM

## 2022-01-28 DIAGNOSIS — R07.9 LEFT-SIDED CHEST PAIN: ICD-10-CM

## 2022-01-28 LAB — D DIMER PPP FEU-MCNC: <0.27 MCGFEU/ML (ref 0–0.46)

## 2022-01-28 PROCEDURE — 85379 FIBRIN DEGRADATION QUANT: CPT

## 2022-01-28 PROCEDURE — 36415 COLL VENOUS BLD VENIPUNCTURE: CPT

## 2022-02-09 ENCOUNTER — TELEPHONE (OUTPATIENT)
Dept: FAMILY MEDICINE CLINIC | Facility: CLINIC | Age: 22
End: 2022-02-09

## 2022-02-09 NOTE — TELEPHONE ENCOUNTER
Confirmation number for approval for CT of chest for [facility] is [id number].  The CT scan is approved to March 5, 2022.

## 2022-02-09 NOTE — TELEPHONE ENCOUNTER
Raheel called to inform you that the CT that was ordered is not being covered by insurance. If you would like to do a peer to peer the number is [phone]. She needs to know next steps by tomorrow.

## 2022-02-11 ENCOUNTER — HOSPITAL ENCOUNTER (OUTPATIENT)
Dept: CT IMAGING | Facility: HOSPITAL | Age: 22
End: 2022-02-11

## 2022-02-18 ENCOUNTER — TELEPHONE (OUTPATIENT)
Dept: FAMILY MEDICINE CLINIC | Facility: CLINIC | Age: 22
End: 2022-02-18

## 2022-02-18 NOTE — TELEPHONE ENCOUNTER
I spoke with pt regarding his chest CT. He stated he forgot what date it was rescheduled to. I told him it was the 15th. I asked if he still wanted to have this done and he said he is feeling a lot better and no longer having chest pain. Advised that if it came back or he had concerns to call the office.

## 2022-03-11 ENCOUNTER — OFFICE VISIT (OUTPATIENT)
Dept: FAMILY MEDICINE CLINIC | Facility: CLINIC | Age: 22
End: 2022-03-11

## 2022-03-11 VITALS
SYSTOLIC BLOOD PRESSURE: 128 MMHG | BODY MASS INDEX: 52.48 KG/M2 | DIASTOLIC BLOOD PRESSURE: 76 MMHG | HEART RATE: 93 BPM | HEIGHT: 65 IN | WEIGHT: 315 LBS | OXYGEN SATURATION: 98 % | TEMPERATURE: 98.4 F

## 2022-03-11 DIAGNOSIS — E78.2 MIXED HYPERLIPIDEMIA: ICD-10-CM

## 2022-03-11 DIAGNOSIS — B37.2 SKIN YEAST INFECTION: ICD-10-CM

## 2022-03-11 DIAGNOSIS — R73.01 ELEVATED FASTING BLOOD SUGAR: ICD-10-CM

## 2022-03-11 DIAGNOSIS — E88.81 METABOLIC SYNDROME: ICD-10-CM

## 2022-03-11 DIAGNOSIS — E66.01 MORBID OBESITY WITH BMI OF 50.0-59.9, ADULT: ICD-10-CM

## 2022-03-11 DIAGNOSIS — I10 PRIMARY HYPERTENSION: Primary | ICD-10-CM

## 2022-03-11 DIAGNOSIS — I10 ESSENTIAL HYPERTENSION: ICD-10-CM

## 2022-03-11 PROCEDURE — 99214 OFFICE O/P EST MOD 30 MIN: CPT | Performed by: FAMILY MEDICINE

## 2022-03-11 RX ORDER — CLOTRIMAZOLE 1 %
1 CREAM (GRAM) TOPICAL 2 TIMES DAILY
Qty: 60 G | Refills: 1 | Status: SHIPPED | OUTPATIENT
Start: 2022-03-11 | End: 2022-10-26

## 2022-03-11 RX ORDER — DIAPER,BRIEF,INFANT-TODD,DISP
1 EACH MISCELLANEOUS 2 TIMES DAILY
Qty: 56 G | Refills: 1 | Status: SHIPPED | OUTPATIENT
Start: 2022-03-11 | End: 2022-10-26

## 2022-03-11 RX ORDER — LISINOPRIL 20 MG/1
20 TABLET ORAL DAILY
Qty: 90 TABLET | Refills: 1 | Status: SHIPPED | OUTPATIENT
Start: 2022-03-11 | End: 2022-09-21 | Stop reason: SDUPTHER

## 2022-03-11 NOTE — PROGRESS NOTES
Chief Complaint   Patient presents with   • Hypertension       Subjective   Inocencio Ulrich is a 21 y.o. male.     History of Present Illness   Blood pressure today 120s over 70s.  Tolerating his lisinopril without issue    Body mass index is 58.58 kg/m².    Last blood work was obtained 11 months prior.  Fasting glucose 101.  Normal kidney and liver function.  Normal blood counts.  Normal thyroid labs as well.    Only abnormality screening total cholesterol was 219 with LDL in the 130s.    Has been drinking sodas and fast foods.  Is interested in considering more medical and weight management options    The following portions of the patient's history were reviewed and updated as appropriate: allergies, current medications, past family history, past medical history, past social history, past surgical history and problem list.      Past Medical History:   Diagnosis Date   • Hypertension        Past Surgical History:   Procedure Laterality Date   • EAR TUBES     • UMBILICAL HERNIA REPAIR         Family History   Problem Relation Age of Onset   • Hypertension Mother    • Depression Mother    • Anxiety disorder Mother    • Alcohol abuse Father        Social History     Socioeconomic History   • Marital status: Single   Tobacco Use   • Smoking status: Former Smoker     Packs/day: 0.50     Types: Cigarettes   • Smokeless tobacco: Never Used   Vaping Use   • Vaping Use: Never used   Substance and Sexual Activity   • Alcohol use: Yes     Alcohol/week: 4.0 standard drinks     Types: 4 Cans of beer per week   • Drug use: Not Currently   • Sexual activity: Yes       Current Outpatient Medications on File Prior to Visit   Medication Sig Dispense Refill   • [DISCONTINUED] lisinopril (PRINIVIL,ZESTRIL) 20 MG tablet Take 1 tablet by mouth Daily. 90 tablet 1     No current facility-administered medications on file prior to visit.       Review of Systems   Constitutional: Negative for fever.   HENT: Negative for congestion.     Respiratory: Negative for shortness of breath.    Gastrointestinal: Negative for abdominal pain.   Genitourinary: Negative for decreased urine volume.   Musculoskeletal: Negative for back pain.   Neurological: Negative for weakness.   Psychiatric/Behavioral: Negative for depressed mood.           Vitals:    03/11/22 1049   BP: 128/76   Pulse: 93   Temp: 98.4 °F (36.9 °C)   SpO2: 98%      Objective   Physical Exam  Vitals and nursing note reviewed.   Constitutional:       Appearance: He is well-developed. He is obese.   Cardiovascular:      Rate and Rhythm: Normal rate.      Heart sounds: Normal heart sounds. No murmur heard.  Pulmonary:      Effort: Pulmonary effort is normal. No respiratory distress.      Breath sounds: Normal breath sounds.   Abdominal:      Palpations: Abdomen is soft.   Musculoskeletal:      Cervical back: Neck supple.   Skin:     General: Skin is warm and dry.   Neurological:      Mental Status: He is alert and oriented to person, place, and time.           Assessment/Plan   Problems Addressed this Visit    None     Visit Diagnoses     Primary hypertension    -  Primary    Relevant Medications    lisinopril (PRINIVIL,ZESTRIL) 20 MG tablet    Other Relevant Orders    Basic metabolic panel    Hemoglobin A1c    TSH Rfx On Abnormal To Free T4    Essential hypertension        Relevant Medications    lisinopril (PRINIVIL,ZESTRIL) 20 MG tablet    Other Relevant Orders    Basic metabolic panel    Hemoglobin A1c    TSH Rfx On Abnormal To Free T4    Metabolic syndrome        Relevant Medications    lisinopril (PRINIVIL,ZESTRIL) 20 MG tablet    Other Relevant Orders    Basic metabolic panel    Hemoglobin A1c    TSH Rfx On Abnormal To Free T4    Morbid obesity with BMI of 50.0-59.9, adult (HCC)        Relevant Medications    lisinopril (PRINIVIL,ZESTRIL) 20 MG tablet    Other Relevant Orders    Basic metabolic panel    Hemoglobin A1c    TSH Rfx On Abnormal To Free T4    Mixed hyperlipidemia         Relevant Medications    lisinopril (PRINIVIL,ZESTRIL) 20 MG tablet    Other Relevant Orders    Basic metabolic panel    Hemoglobin A1c    TSH Rfx On Abnormal To Free T4    Elevated fasting blood sugar        Relevant Medications    lisinopril (PRINIVIL,ZESTRIL) 20 MG tablet    Other Relevant Orders    Basic metabolic panel    Hemoglobin A1c    TSH Rfx On Abnormal To Free T4    Skin yeast infection        Relevant Medications    clotrimazole (Lotrimin AF) 1 % cream    hydrocortisone 0.5 % cream      Diagnoses       Codes Comments    Primary hypertension    -  Primary ICD-10-CM: I10  ICD-9-CM: 401.9     Essential hypertension     ICD-10-CM: I10  ICD-9-CM: 401.9     Metabolic syndrome     ICD-10-CM: E88.81  ICD-9-CM: 277.7     Morbid obesity with BMI of 50.0-59.9, adult (HCC)     ICD-10-CM: E66.01, Z68.43  ICD-9-CM: 278.01, V85.43     Mixed hyperlipidemia     ICD-10-CM: E78.2  ICD-9-CM: 272.2     Elevated fasting blood sugar     ICD-10-CM: R73.01  ICD-9-CM: 790.21     Skin yeast infection     ICD-10-CM: B37.2  ICD-9-CM: 112.3         -Stable hypertension  -Would encourage cutting out fast food soda beer/nutrition counseling   -Medical weight management referral         Azul Cortez MD

## 2022-03-12 LAB
BUN SERPL-MCNC: 12 MG/DL (ref 6–20)
BUN/CREAT SERPL: 15 (ref 9–20)
CALCIUM SERPL-MCNC: 9.2 MG/DL (ref 8.7–10.2)
CHLORIDE SERPL-SCNC: 102 MMOL/L (ref 96–106)
CO2 SERPL-SCNC: 21 MMOL/L (ref 20–29)
CREAT SERPL-MCNC: 0.79 MG/DL (ref 0.76–1.27)
EGFR GENE MUT ANL BLD/T: 128 ML/MIN/1.73
GLUCOSE SERPL-MCNC: 93 MG/DL (ref 65–99)
HBA1C MFR BLD: 5.2 % (ref 4.8–5.6)
POTASSIUM SERPL-SCNC: 4.4 MMOL/L (ref 3.5–5.2)
SODIUM SERPL-SCNC: 141 MMOL/L (ref 134–144)
TSH SERPL DL<=0.005 MIU/L-ACNC: 1.82 UIU/ML (ref 0.45–4.5)

## 2022-08-27 PROCEDURE — 36415 COLL VENOUS BLD VENIPUNCTURE: CPT

## 2022-08-27 PROCEDURE — 99283 EMERGENCY DEPT VISIT LOW MDM: CPT

## 2022-08-28 ENCOUNTER — APPOINTMENT (OUTPATIENT)
Dept: GENERAL RADIOLOGY | Facility: HOSPITAL | Age: 22
End: 2022-08-28

## 2022-08-28 ENCOUNTER — HOSPITAL ENCOUNTER (EMERGENCY)
Facility: HOSPITAL | Age: 22
Discharge: HOME | End: 2022-08-28
Attending: EMERGENCY MEDICINE | Admitting: EMERGENCY MEDICINE

## 2022-08-28 VITALS
TEMPERATURE: 98 F | DIASTOLIC BLOOD PRESSURE: 78 MMHG | HEIGHT: 66 IN | SYSTOLIC BLOOD PRESSURE: 138 MMHG | RESPIRATION RATE: 16 BRPM | WEIGHT: 315 LBS | BODY MASS INDEX: 50.62 KG/M2 | OXYGEN SATURATION: 96 % | HEART RATE: 78 BPM

## 2022-08-28 DIAGNOSIS — I10 PRIMARY HYPERTENSION: Primary | ICD-10-CM

## 2022-08-28 LAB
ALBUMIN SERPL-MCNC: 4.6 G/DL (ref 3.5–5.2)
ALBUMIN/GLOB SERPL: 1.9 G/DL
ALP SERPL-CCNC: 58 U/L (ref 39–117)
ALT SERPL W P-5'-P-CCNC: 44 U/L (ref 1–41)
ANION GAP SERPL CALCULATED.3IONS-SCNC: 13.5 MMOL/L (ref 5–15)
AST SERPL-CCNC: 17 U/L (ref 1–40)
BASOPHILS # BLD AUTO: 0.08 10*3/MM3 (ref 0–0.2)
BASOPHILS NFR BLD AUTO: 0.9 % (ref 0–1.5)
BILIRUB SERPL-MCNC: 0.4 MG/DL (ref 0–1.2)
BUN SERPL-MCNC: 14 MG/DL (ref 6–20)
BUN/CREAT SERPL: 18.7 (ref 7–25)
CALCIUM SPEC-SCNC: 9.2 MG/DL (ref 8.6–10.5)
CHLORIDE SERPL-SCNC: 100 MMOL/L (ref 98–107)
CO2 SERPL-SCNC: 23.5 MMOL/L (ref 22–29)
CREAT SERPL-MCNC: 0.75 MG/DL (ref 0.76–1.27)
DEPRECATED RDW RBC AUTO: 40.6 FL (ref 37–54)
EGFRCR SERPLBLD CKD-EPI 2021: 129.2 ML/MIN/1.73
EOSINOPHIL # BLD AUTO: 0.12 10*3/MM3 (ref 0–0.4)
EOSINOPHIL NFR BLD AUTO: 1.4 % (ref 0.3–6.2)
ERYTHROCYTE [DISTWIDTH] IN BLOOD BY AUTOMATED COUNT: 12.8 % (ref 12.3–15.4)
GLOBULIN UR ELPH-MCNC: 2.4 GM/DL
GLUCOSE SERPL-MCNC: 108 MG/DL (ref 65–99)
HCT VFR BLD AUTO: 40.4 % (ref 37.5–51)
HGB BLD-MCNC: 14.1 G/DL (ref 13–17.7)
IMM GRANULOCYTES # BLD AUTO: 0.05 10*3/MM3 (ref 0–0.05)
IMM GRANULOCYTES NFR BLD AUTO: 0.6 % (ref 0–0.5)
LYMPHOCYTES # BLD AUTO: 1.95 10*3/MM3 (ref 0.7–3.1)
LYMPHOCYTES NFR BLD AUTO: 22 % (ref 19.6–45.3)
MCH RBC QN AUTO: 30.9 PG (ref 26.6–33)
MCHC RBC AUTO-ENTMCNC: 34.9 G/DL (ref 31.5–35.7)
MCV RBC AUTO: 88.4 FL (ref 79–97)
MONOCYTES # BLD AUTO: 0.65 10*3/MM3 (ref 0.1–0.9)
MONOCYTES NFR BLD AUTO: 7.3 % (ref 5–12)
NEUTROPHILS NFR BLD AUTO: 6 10*3/MM3 (ref 1.7–7)
NEUTROPHILS NFR BLD AUTO: 67.8 % (ref 42.7–76)
NRBC BLD AUTO-RTO: 0 /100 WBC (ref 0–0.2)
PLATELET # BLD AUTO: 210 10*3/MM3 (ref 140–450)
PMV BLD AUTO: 11.4 FL (ref 6–12)
POTASSIUM SERPL-SCNC: 4 MMOL/L (ref 3.5–5.2)
PROT SERPL-MCNC: 7 G/DL (ref 6–8.5)
QT INTERVAL: 367 MS
RBC # BLD AUTO: 4.57 10*6/MM3 (ref 4.14–5.8)
SODIUM SERPL-SCNC: 137 MMOL/L (ref 136–145)
WBC NRBC COR # BLD: 8.85 10*3/MM3 (ref 3.4–10.8)

## 2022-08-28 PROCEDURE — 71046 X-RAY EXAM CHEST 2 VIEWS: CPT

## 2022-08-28 PROCEDURE — 93010 ELECTROCARDIOGRAM REPORT: CPT | Performed by: INTERNAL MEDICINE

## 2022-08-28 PROCEDURE — 93005 ELECTROCARDIOGRAM TRACING: CPT | Performed by: EMERGENCY MEDICINE

## 2022-08-28 PROCEDURE — 85025 COMPLETE CBC W/AUTO DIFF WBC: CPT | Performed by: EMERGENCY MEDICINE

## 2022-08-28 PROCEDURE — 80053 COMPREHEN METABOLIC PANEL: CPT | Performed by: EMERGENCY MEDICINE

## 2022-08-28 RX ORDER — SODIUM CHLORIDE 0.9 % (FLUSH) 0.9 %
10 SYRINGE (ML) INJECTION AS NEEDED
Status: DISCONTINUED | OUTPATIENT
Start: 2022-08-28 | End: 2022-08-28 | Stop reason: HOSPADM

## 2022-09-16 RX ORDER — METOPROLOL SUCCINATE 25 MG/1
25 TABLET, EXTENDED RELEASE ORAL DAILY
COMMUNITY
Start: 2022-08-05 | End: 2022-09-21

## 2022-09-21 ENCOUNTER — TELEPHONE (OUTPATIENT)
Dept: FAMILY MEDICINE CLINIC | Facility: CLINIC | Age: 22
End: 2022-09-21

## 2022-09-21 ENCOUNTER — OFFICE VISIT (OUTPATIENT)
Dept: FAMILY MEDICINE CLINIC | Facility: CLINIC | Age: 22
End: 2022-09-21

## 2022-09-21 VITALS
SYSTOLIC BLOOD PRESSURE: 146 MMHG | WEIGHT: 315 LBS | HEIGHT: 66 IN | DIASTOLIC BLOOD PRESSURE: 94 MMHG | OXYGEN SATURATION: 98 % | HEART RATE: 88 BPM | TEMPERATURE: 97.8 F | BODY MASS INDEX: 50.62 KG/M2

## 2022-09-21 DIAGNOSIS — E66.01 MORBID OBESITY WITH BMI OF 50.0-59.9, ADULT: ICD-10-CM

## 2022-09-21 DIAGNOSIS — E88.81 METABOLIC SYNDROME: ICD-10-CM

## 2022-09-21 DIAGNOSIS — I10 ESSENTIAL HYPERTENSION: ICD-10-CM

## 2022-09-21 DIAGNOSIS — R73.01 ELEVATED FASTING BLOOD SUGAR: ICD-10-CM

## 2022-09-21 DIAGNOSIS — E78.2 MIXED HYPERLIPIDEMIA: ICD-10-CM

## 2022-09-21 DIAGNOSIS — I10 PRIMARY HYPERTENSION: ICD-10-CM

## 2022-09-21 PROCEDURE — 99214 OFFICE O/P EST MOD 30 MIN: CPT | Performed by: FAMILY MEDICINE

## 2022-09-21 RX ORDER — LISINOPRIL 20 MG/1
20 TABLET ORAL 2 TIMES DAILY
Qty: 180 TABLET | Refills: 1 | Status: SHIPPED | OUTPATIENT
Start: 2022-09-21 | End: 2022-09-22

## 2022-09-21 NOTE — TELEPHONE ENCOUNTER
[facility] called and they advised that Inocencio's insurance will not cover the lisinopril 20mg two times a day. However they did advise that his insurance will cover lisinopril 40mg once a day. Would you like to change? If so he will need a new script. Please Advise.

## 2022-09-21 NOTE — PROGRESS NOTES
Chief Complaint   Patient presents with   • Hypertension       Subjective   Inocencio Ulrich is a 22 y.o. male.     History of Present Illness   Here for HTN and weight f/u    2x ER visits since last OV  Panic and HTN have worsened. He attributes this to weaning down and now off of all alcohol. Had weaned down to 3 beer a day, now completely off alcohol likely contributing to anxiety/panic  BP spikes. He had been drinking a significant amount daily. His weight has also gone up    Did not take lisinopril today but it does work when he does. He forgot to take it today. /94. Normal renal function. Normal esg, CBC, CMP at ER.     In additional to quitting alcohol, he is making dietary adjustments, less fast food      The following portions of the patient's history were reviewed and updated as appropriate: allergies, current medications, past family history, past medical history, past social history, past surgical history and problem list.      Past Medical History:   Diagnosis Date   • Hypertension        Past Surgical History:   Procedure Laterality Date   • EAR TUBES     • UMBILICAL HERNIA REPAIR         Family History   Problem Relation Age of Onset   • Hypertension Mother    • Depression Mother    • Anxiety disorder Mother    • Alcohol abuse Father        Social History     Socioeconomic History   • Marital status: Single   Tobacco Use   • Smoking status: Former Smoker     Packs/day: 0.50     Types: Cigarettes   • Smokeless tobacco: Never Used   Vaping Use   • Vaping Use: Never used   Substance and Sexual Activity   • Alcohol use: Yes     Alcohol/week: 4.0 standard drinks     Types: 4 Cans of beer per week   • Drug use: Not Currently   • Sexual activity: Yes       Current Outpatient Medications on File Prior to Visit   Medication Sig Dispense Refill   • clotrimazole (Lotrimin AF) 1 % cream Apply 1 application topically to the appropriate area as directed 2 (Two) Times a Day. 60 g 1   • hydrocortisone 0.5 % cream  Apply 1 application topically to the appropriate area as directed 2 (Two) Times a Day. 56 g 1     No current facility-administered medications on file prior to visit.       Review of Systems   Constitutional: Negative for fever.   HENT: Negative for congestion.    Respiratory: Negative for cough.    Cardiovascular: Negative for chest pain.   Gastrointestinal: Negative for abdominal pain.   Neurological: Negative for weakness.   Psychiatric/Behavioral: Negative for depressed mood.           Vitals:    09/21/22 0945   BP: 146/94   Pulse: 88   Temp: 97.8 °F (36.6 °C)   SpO2: 98%      Objective   Physical Exam  Vitals reviewed.   Cardiovascular:      Rate and Rhythm: Normal rate and regular rhythm.      Pulses: Normal pulses.   Pulmonary:      Effort: Pulmonary effort is normal.   Abdominal:      General: Abdomen is flat.   Skin:     Capillary Refill: Capillary refill takes less than 2 seconds.   Neurological:      General: No focal deficit present.      Mental Status: He is alert.           Assessment & Plan   Problems Addressed this Visit    None     Visit Diagnoses     Primary hypertension        Relevant Orders    Ambulatory Referral to Bariatric Surgery    Essential hypertension        Relevant Orders    Ambulatory Referral to Bariatric Surgery    Metabolic syndrome        Relevant Orders    Ambulatory Referral to Bariatric Surgery    Morbid obesity with BMI of 50.0-59.9, adult ([facility])        Relevant Orders    Ambulatory Referral to Bariatric Surgery    Mixed hyperlipidemia        Relevant Orders    Ambulatory Referral to Bariatric Surgery    Elevated fasting blood sugar        Relevant Orders    Ambulatory Referral to Bariatric Surgery      Diagnoses       Codes Comments    Primary hypertension     ICD-10-CM: I10  ICD-9-CM: 401.9     Essential hypertension     ICD-10-CM: I10  ICD-9-CM: 401.9     Metabolic syndrome     ICD-10-CM: E88.81  ICD-9-CM: 277.7     Morbid obesity with BMI of 50.0-59.9, adult (HCC)      ICD-10-CM: E66.01, Z68.43  ICD-9-CM: 278.01, V85.43     Mixed hyperlipidemia     ICD-10-CM: E78.2  ICD-9-CM: 272.2     Elevated fasting blood sugar     ICD-10-CM: R73.01  ICD-9-CM: 790.21           -Bariatrics/weight management referral to discuss. Needs to make changes for health. Already is hypertensive   -Continue alcohol cessation work on increasing activity levels and diet   -Can consider lexapro for mood/pt to let us know  -Would increase lisinopril to 40mg    closer f/u 3 mo            Azul Cortez MD

## 2022-09-22 DIAGNOSIS — R73.01 ELEVATED FASTING BLOOD SUGAR: ICD-10-CM

## 2022-09-22 DIAGNOSIS — E88.81 METABOLIC SYNDROME: ICD-10-CM

## 2022-09-22 DIAGNOSIS — I10 PRIMARY HYPERTENSION: ICD-10-CM

## 2022-09-22 DIAGNOSIS — I10 ESSENTIAL HYPERTENSION: ICD-10-CM

## 2022-09-22 DIAGNOSIS — E78.2 MIXED HYPERLIPIDEMIA: ICD-10-CM

## 2022-09-22 DIAGNOSIS — E66.01 MORBID OBESITY WITH BMI OF 50.0-59.9, ADULT: ICD-10-CM

## 2022-09-22 RX ORDER — LISINOPRIL 20 MG/1
20 TABLET ORAL DAILY
Qty: 90 TABLET | Refills: 1 | Status: SHIPPED | OUTPATIENT
Start: 2022-09-22 | End: 2022-11-23 | Stop reason: SDUPTHER

## 2022-11-23 ENCOUNTER — OFFICE VISIT (OUTPATIENT)
Dept: FAMILY MEDICINE CLINIC | Facility: CLINIC | Age: 22
End: 2022-11-23

## 2022-11-23 VITALS
HEART RATE: 98 BPM | BODY MASS INDEX: 50.62 KG/M2 | HEIGHT: 66 IN | OXYGEN SATURATION: 100 % | DIASTOLIC BLOOD PRESSURE: 84 MMHG | SYSTOLIC BLOOD PRESSURE: 128 MMHG | TEMPERATURE: 98 F | WEIGHT: 315 LBS

## 2022-11-23 DIAGNOSIS — E66.01 MORBID OBESITY WITH BMI OF 50.0-59.9, ADULT: ICD-10-CM

## 2022-11-23 DIAGNOSIS — E78.2 MIXED HYPERLIPIDEMIA: ICD-10-CM

## 2022-11-23 DIAGNOSIS — I10 ESSENTIAL HYPERTENSION: ICD-10-CM

## 2022-11-23 DIAGNOSIS — R73.01 ELEVATED FASTING BLOOD SUGAR: ICD-10-CM

## 2022-11-23 DIAGNOSIS — I10 PRIMARY HYPERTENSION: ICD-10-CM

## 2022-11-23 DIAGNOSIS — E88.81 METABOLIC SYNDROME: ICD-10-CM

## 2022-11-23 PROCEDURE — 99213 OFFICE O/P EST LOW 20 MIN: CPT | Performed by: FAMILY MEDICINE

## 2022-11-23 RX ORDER — LISINOPRIL 20 MG/1
20 TABLET ORAL DAILY
Qty: 90 TABLET | Refills: 3 | Status: SHIPPED | OUTPATIENT
Start: 2022-11-23 | End: 2023-02-21

## 2022-11-23 NOTE — PROGRESS NOTES
Chief Complaint   Patient presents with   • Hypertension       Subjective   Inocencio Ulrich is a 22 y.o. male.     History of Present Illness   Here for f/u BP check and anxiety    HTN: home cuff likely too small, when his BP does spike it is typically SBP 150s. On Lisinopril 20mg. He had lightheadedness symptoms with 40mg higher dose. BP today 120s/80s on lisinopril 20mg    Panic and anxiety have improved with switch to new job, Manageable now per pt. He is maintaining alcohol and nicotine cessation. Denies si/self harm. We did not start ssri.      He otherwise feels well at this time    The following portions of the patient's history were reviewed and updated as appropriate: allergies, current medications, past family history, past medical history, past social history, past surgical history and problem list.      Past Medical History:   Diagnosis Date   • Hypertension        Past Surgical History:   Procedure Laterality Date   • EAR TUBES     • UMBILICAL HERNIA REPAIR         Family History   Problem Relation Age of Onset   • Hypertension Mother    • Depression Mother    • Anxiety disorder Mother    • Alcohol abuse Father        Social History     Socioeconomic History   • Marital status: Single   Tobacco Use   • Smoking status: Former     Packs/day: 0.50     Types: Cigarettes   • Smokeless tobacco: Never   Vaping Use   • Vaping Use: Never used   Substance and Sexual Activity   • Alcohol use: Yes     Alcohol/week: 4.0 standard drinks     Types: 4 Cans of beer per week   • Drug use: Not Currently   • Sexual activity: Yes       Current Outpatient Medications on File Prior to Visit   Medication Sig Dispense Refill   • [DISCONTINUED] lisinopril (PRINIVIL,ZESTRIL) 20 MG tablet Take 1 tablet by mouth Daily for 90 days. 90 tablet 1   • [DISCONTINUED] promethazine-dextromethorphan (PROMETHAZINE-DM) 6.25-15 MG/5ML syrup Take 5 mL by mouth 4 (Four) Times a Day As Needed for Cough. 118 mL 0     No current facility-administered  medications on file prior to visit.       Review of Systems   Constitutional: Negative for fever.   HENT: Negative for congestion.    Respiratory: Negative for cough.    Cardiovascular: Negative for palpitations.   Gastrointestinal: Negative for abdominal pain.   Neurological: Negative for weakness.           Vitals:    11/23/22 1350   BP: 128/84   Pulse: 98   Temp: 98 °F (36.7 °C)   SpO2: 100%      Objective   Physical Exam  Vitals reviewed.   Cardiovascular:      Rate and Rhythm: Normal rate.   Pulmonary:      Effort: Pulmonary effort is normal.   Neurological:      General: No focal deficit present.      Mental Status: He is alert.           Assessment & Plan   Problems Addressed this Visit    None  Visit Diagnoses     Primary hypertension        Relevant Medications    lisinopril (PRINIVIL,ZESTRIL) 20 MG tablet    Essential hypertension        Relevant Medications    lisinopril (PRINIVIL,ZESTRIL) 20 MG tablet    Metabolic syndrome        Relevant Medications    lisinopril (PRINIVIL,ZESTRIL) 20 MG tablet    Morbid obesity with BMI of 50.0-59.9, adult (HCC)        Relevant Medications    lisinopril (PRINIVIL,ZESTRIL) 20 MG tablet    Mixed hyperlipidemia        Relevant Medications    lisinopril (PRINIVIL,ZESTRIL) 20 MG tablet    Elevated fasting blood sugar        Relevant Medications    lisinopril (PRINIVIL,ZESTRIL) 20 MG tablet      Diagnoses       Codes Comments    Primary hypertension     ICD-10-CM: I10  ICD-9-CM: 401.9     Essential hypertension     ICD-10-CM: I10  ICD-9-CM: 401.9     Metabolic syndrome     ICD-10-CM: E88.81  ICD-9-CM: 277.7     Morbid obesity with BMI of 50.0-59.9, adult (HCC)     ICD-10-CM: E66.01, Z68.43  ICD-9-CM: 278.01, V85.43     Mixed hyperlipidemia     ICD-10-CM: E78.2  ICD-9-CM: 272.2     Elevated fasting blood sugar     ICD-10-CM: R73.01  ICD-9-CM: 790.21         Continue with lisinopril 20mg stable today no changes  Discussed weight  Management options in past, patient will  transfer to Dr Tucker in [place]            Azul Cortez MD